# Patient Record
Sex: MALE | Race: WHITE | NOT HISPANIC OR LATINO | URBAN - METROPOLITAN AREA
[De-identification: names, ages, dates, MRNs, and addresses within clinical notes are randomized per-mention and may not be internally consistent; named-entity substitution may affect disease eponyms.]

---
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
## 2022-01-31 ENCOUNTER — TELEPHONE (OUTPATIENT)
Dept: DERMATOLOGY | Facility: CLINIC | Age: 22
End: 2022-01-31

## 2022-01-31 NOTE — TELEPHONE ENCOUNTER
[facility] Call Center    Phone Message    May a detailed message be left on voicemail: yes     Reason for Call: Other: Pt's Mother said Pt was previously seen by Dr. Craig and Pt is about to turn 22, he needs an adult dermatologist and she's wondering if Dr. Craig can refer Pt to someone else for his diagnosis, please call Pt's Mother with referral information, thanks     Action Taken: Other: Peds    Travel Screening: Not Applicable

## 2022-01-31 NOTE — CONFIDENTIAL NOTE
Rn spoke with patient's mother. She clarifies that this is in regard to her other son (this patient's twin). RN closing this encounter.